# Patient Record
Sex: FEMALE | NOT HISPANIC OR LATINO
[De-identification: names, ages, dates, MRNs, and addresses within clinical notes are randomized per-mention and may not be internally consistent; named-entity substitution may affect disease eponyms.]

---

## 2018-11-26 ENCOUNTER — MEDICATION RENEWAL (OUTPATIENT)
Age: 38
End: 2018-11-26

## 2018-11-26 PROBLEM — Z00.00 ENCOUNTER FOR PREVENTIVE HEALTH EXAMINATION: Status: ACTIVE | Noted: 2018-11-26

## 2018-11-27 ENCOUNTER — RX RENEWAL (OUTPATIENT)
Age: 38
End: 2018-11-27

## 2018-11-28 ENCOUNTER — RX RENEWAL (OUTPATIENT)
Age: 38
End: 2018-11-28

## 2018-12-21 ENCOUNTER — RECORD ABSTRACTING (OUTPATIENT)
Age: 38
End: 2018-12-21

## 2018-12-21 DIAGNOSIS — Z78.9 OTHER SPECIFIED HEALTH STATUS: ICD-10-CM

## 2018-12-21 DIAGNOSIS — K76.9 LIVER DISEASE, UNSPECIFIED: ICD-10-CM

## 2018-12-28 ENCOUNTER — APPOINTMENT (OUTPATIENT)
Dept: GASTROENTEROLOGY | Facility: HOSPITAL | Age: 38
End: 2018-12-28

## 2019-01-25 ENCOUNTER — APPOINTMENT (OUTPATIENT)
Dept: GASTROENTEROLOGY | Facility: CLINIC | Age: 39
End: 2019-01-25
Payer: COMMERCIAL

## 2019-01-25 VITALS
SYSTOLIC BLOOD PRESSURE: 108 MMHG | HEART RATE: 86 BPM | BODY MASS INDEX: 27.17 KG/M2 | OXYGEN SATURATION: 99 % | WEIGHT: 133 LBS | DIASTOLIC BLOOD PRESSURE: 62 MMHG | HEIGHT: 58.5 IN

## 2019-01-25 DIAGNOSIS — K21.9 GASTRO-ESOPHAGEAL REFLUX DISEASE W/OUT ESOPHAGITIS: ICD-10-CM

## 2019-01-25 PROCEDURE — 99214 OFFICE O/P EST MOD 30 MIN: CPT

## 2019-01-25 RX ORDER — OMEPRAZOLE 40 MG/1
40 CAPSULE, DELAYED RELEASE ORAL
Qty: 90 | Refills: 0 | Status: DISCONTINUED | COMMUNITY
Start: 2018-11-26 | End: 2019-01-25

## 2019-01-25 RX ORDER — DROSPIRENONE AND ETHINYL ESTRADIOL 0.02-3(28)
3-0.02 KIT ORAL
Refills: 0 | Status: ACTIVE | COMMUNITY

## 2019-01-25 NOTE — PHYSICAL EXAM
[General Appearance - Alert] : alert [General Appearance - In No Acute Distress] : in no acute distress [Sclera] : the sclera and conjunctiva were normal [Outer Ear] : the ears and nose were normal in appearance [Neck Appearance] : the appearance of the neck was normal [] : no respiratory distress [Apical Impulse] : the apical impulse was normal [Abdomen Soft] : soft [Abdomen Tenderness] : non-tender [Abnormal Walk] : normal gait [Skin Color & Pigmentation] : normal skin color and pigmentation [No Focal Deficits] : no focal deficits [Oriented To Time, Place, And Person] : oriented to person, place, and time

## 2019-01-30 NOTE — HISTORY OF PRESENT ILLNESS
[FreeTextEntry1] : 37 yo f h/o IBS-alternating c/d, h/o Hodgkins Lymphoma,dx June 2015 s/p chemo x 6 mos \par        here for follow up for reflux, constipation/diarrhea. \par \par Today, she continues to take PPI BID. nausea every morning followed by vomiting. weight is stable. 90% of the time she has diarrhea with 2-3 bm per day, watery, brown stool,. no further rectal bleeding/melena. continues to have intermittent heartburn. sore throat is better. feels bloated constantly. . eating well, not as much as she used to . Feels she does not have same exercise capacity that she is used to. She has chronic numbenss/tingling attributed to prior chemotherapy. \par \par MRE 1/21/19- no bowel obstruction, inflammed bowel segments, or abdominal fluid collections. Diffuse hepatic steatosis with stable nodular are of fatty sparing in right hepatic lobe\par \par She has not yet seen neurologist for her headaches and numbness/tingling. \par \par Recent GES and esophageal manometry testing normal. \par \par EGD was normal 06/2018, went to once a day ppi, started vomiting again, then resumed BID dosing. \par \par Linzess started in July at lowest dose, caused too much diarrhea, so she stopped it.\par \par EGD/Colonoscopy 12/28/18- for vomiting, rectal bleeding, diarrhea. \par TI/right colon/left colon/rectum bx normal. Descending colon polyp-hyperplastic\par duodenum- normal, stomach HP negative gastritis\par distal esophagus-hyperplastic changes\par proximal esophagus-normal. \par recall for colonoscopy in 5 years. \par \par Labs 11/2018 notable for low alk phos=24, glucose 111, normal ESR/CRP, B12 252, normal CBC\par

## 2019-01-30 NOTE — ASSESSMENT
[FreeTextEntry1] : repeat labs today. patient referred to neurologist. Recommend 24 hour pH/impedance study for further evaluation of reflux which persists on PPI BID.

## 2019-02-28 ENCOUNTER — APPOINTMENT (OUTPATIENT)
Dept: GASTROENTEROLOGY | Facility: HOSPITAL | Age: 39
End: 2019-02-28

## 2019-03-27 ENCOUNTER — APPOINTMENT (OUTPATIENT)
Dept: NEUROLOGY | Facility: CLINIC | Age: 39
End: 2019-03-27
Payer: COMMERCIAL

## 2019-03-27 VITALS
DIASTOLIC BLOOD PRESSURE: 81 MMHG | WEIGHT: 134 LBS | BODY MASS INDEX: 27.01 KG/M2 | HEIGHT: 59 IN | HEART RATE: 80 BPM | SYSTOLIC BLOOD PRESSURE: 111 MMHG

## 2019-03-27 DIAGNOSIS — Z84.2 FAMILY HISTORY OF OTHER DISEASES OF THE GENITOURINARY SYSTEM: ICD-10-CM

## 2019-03-27 DIAGNOSIS — Z82.49 FAMILY HISTORY OF ISCHEMIC HEART DISEASE AND OTHER DISEASES OF THE CIRCULATORY SYSTEM: ICD-10-CM

## 2019-03-27 DIAGNOSIS — Z83.79 FAMILY HISTORY OF OTHER DISEASES OF THE DIGESTIVE SYSTEM: ICD-10-CM

## 2019-03-27 DIAGNOSIS — Z83.49 FAMILY HISTORY OF OTHER ENDOCRINE, NUTRITIONAL AND METABOLIC DISEASES: ICD-10-CM

## 2019-03-27 DIAGNOSIS — M50.90 CERVICAL DISC DISORDER, UNSPECIFIED, UNSPECIFIED CERVICAL REGION: ICD-10-CM

## 2019-03-27 DIAGNOSIS — Z82.5 FAMILY HISTORY OF ASTHMA AND OTHER CHRONIC LOWER RESPIRATORY DISEASES: ICD-10-CM

## 2019-03-27 DIAGNOSIS — R51 HEADACHE: ICD-10-CM

## 2019-03-27 DIAGNOSIS — S32.009A UNSPECIFIED FRACTURE OF UNSPECIFIED LUMBAR VERTEBRA, INITIAL ENCOUNTER FOR CLOSED FRACTURE: ICD-10-CM

## 2019-03-27 DIAGNOSIS — Z80.9 FAMILY HISTORY OF MALIGNANT NEOPLASM, UNSPECIFIED: ICD-10-CM

## 2019-03-27 PROCEDURE — 99205 OFFICE O/P NEW HI 60 MIN: CPT

## 2019-03-29 PROBLEM — Z82.49 FAMILY HISTORY OF MYOCARDIAL INFARCTION: Status: ACTIVE | Noted: 2019-03-27

## 2019-03-29 PROBLEM — Z83.79 FAMILY HISTORY OF GASTROESOPHAGEAL REFLUX DISEASE: Status: ACTIVE | Noted: 2019-03-27

## 2019-03-29 PROBLEM — Z84.2 FAMILY HISTORY OF PROSTATE PROBLEMS: Status: ACTIVE | Noted: 2019-03-27

## 2019-03-29 PROBLEM — Z82.5 FAMILY HISTORY OF ASTHMA: Status: ACTIVE | Noted: 2019-03-27

## 2019-03-29 PROBLEM — Z80.9 FAMILY HISTORY OF MALIGNANT NEOPLASM: Status: ACTIVE | Noted: 2019-03-27

## 2019-03-29 PROBLEM — Z82.49 FAMILY HISTORY OF HYPERTENSION: Status: ACTIVE | Noted: 2019-03-27

## 2019-03-29 PROBLEM — Z83.49 FAMILY HISTORY OF THYROID DISEASE: Status: ACTIVE | Noted: 2019-03-27

## 2019-03-29 NOTE — HISTORY OF PRESENT ILLNESS
[FreeTextEntry1] : The patient is a 38 year old female who is referred by Dr. Karimi for further evaluation of the following neurological complaints. She reports brief sharp shooting pain over the scalp lasting for less than a minute. These are not associated with vomiting. In additon she reports transient paresthesias over the feet and posterior calf regions  and/or over the hands which mainly occur when is either sitting or laying on one side. She denies focal motor deficits. She has h/o restless leg syndrome and take requip xr 6mg daily. She reports occasional postural tremors which she was told were benign physiologic tremors. \par \par She reports h/o cervical spondylosis and disc herniations and healed  lumbar spine fractures ( occurred while lifting weights) She followed up with a Orthosurgeon and pain management specialist. \par \par Labs ordered by Dr. Karimi- CBC, CMP , Zinc, ceruloplasmin normal.\par

## 2019-03-29 NOTE — REVIEW OF SYSTEMS
[Fever] : fever [Numbness] : numbness [Dizziness] : dizziness [Anxiety] : anxiety [Eye Pain] : eye pain [Red Eyes] : red eyes [Loss Of Hearing] : hearing loss [Palpitations] : palpitations [Shortness Of Breath] : shortness of breath [Wheezing] : wheezing [Abdominal Pain] : abdominal pain [Vomiting] : vomiting [Constipation] : constipation [Diarrhea] : diarrhea [Hot Flashes] : hot flashes [Negative] : Heme/Lymph [FreeTextEntry2] : change in weight, fatigue, weakness, nightsweats  [de-identified] : tremor, feel off balance, change in vision  [FreeTextEntry4] : ringing in ears  [FreeTextEntry5] : chest pressure,  trouble breathing [FreeTextEntry7] : rectal bleeding, hemorrhoids, reflux , irritable bowel  [FreeTextEntry8] : frequent urination, hesitancy  [de-identified] : heat intolerance

## 2019-03-29 NOTE — PHYSICAL EXAM
[General Appearance - Alert] : alert [General Appearance - In No Acute Distress] : in no acute distress [Oriented To Time, Place, And Person] : oriented to person, place, and time [Impaired Insight] : insight and judgment were intact [Affect] : the affect was normal [Person] : oriented to person [Place] : oriented to place [Time] : oriented to time [Concentration Intact] : normal concentrating ability [Visual Intact] : visual attention was ~T not ~L decreased [Naming Objects] : no difficulty naming common objects [Repeating Phrases] : no difficulty repeating a phrase [Writing A Sentence] : no difficulty writing a sentence [Fluency] : fluency intact [Comprehension] : comprehension intact [Reading] : reading intact [Past History] : adequate knowledge of personal past history [Cranial Nerves Optic (II)] : visual acuity intact bilaterally,  visual fields full to confrontation, pupils equal round and reactive to light [Cranial Nerves Oculomotor (III)] : extraocular motion intact [Cranial Nerves Trigeminal (V)] : facial sensation intact symmetrically [Cranial Nerves Facial (VII)] : face symmetrical [Cranial Nerves Vestibulocochlear (VIII)] : hearing was intact bilaterally [Cranial Nerves Glossopharyngeal (IX)] : tongue and palate midline [Cranial Nerves Accessory (XI - Cranial And Spinal)] : head turning and shoulder shrug symmetric [Cranial Nerves Hypoglossal (XII)] : there was no tongue deviation with protrusion [Motor Tone] : muscle tone was normal in all four extremities [Motor Strength] : muscle strength was normal in all four extremities [No Muscle Atrophy] : normal bulk in all four extremities [Sensation Tactile Decrease] : light touch was intact [Sensation Vibration Decrease] : vibration was intact [Abnormal Walk] : normal gait [Balance] : balance was intact [Past-pointing] : there was no past-pointing [Tremor] : no tremor present [2+] : Ankle jerk left 2+ [Plantar Reflex Right Only] : normal on the right [Plantar Reflex Left Only] : normal on the left

## 2019-04-24 ENCOUNTER — APPOINTMENT (OUTPATIENT)
Dept: GASTROENTEROLOGY | Facility: CLINIC | Age: 39
End: 2019-04-24
Payer: COMMERCIAL

## 2019-04-24 VITALS
OXYGEN SATURATION: 99 % | DIASTOLIC BLOOD PRESSURE: 64 MMHG | SYSTOLIC BLOOD PRESSURE: 108 MMHG | HEART RATE: 75 BPM | BODY MASS INDEX: 27.01 KG/M2 | HEIGHT: 59 IN | WEIGHT: 134 LBS

## 2019-04-24 DIAGNOSIS — E53.8 DEFICIENCY OF OTHER SPECIFIED B GROUP VITAMINS: ICD-10-CM

## 2019-04-24 DIAGNOSIS — R11.10 VOMITING, UNSPECIFIED: ICD-10-CM

## 2019-04-24 DIAGNOSIS — K76.0 FATTY (CHANGE OF) LIVER, NOT ELSEWHERE CLASSIFIED: ICD-10-CM

## 2019-04-24 PROCEDURE — 99215 OFFICE O/P EST HI 40 MIN: CPT

## 2019-04-24 NOTE — PHYSICAL EXAM
[General Appearance - Alert] : alert [General Appearance - In No Acute Distress] : in no acute distress [Sclera] : the sclera and conjunctiva were normal [Neck Appearance] : the appearance of the neck was normal [Outer Ear] : the ears and nose were normal in appearance [] : no respiratory distress [Apical Impulse] : the apical impulse was normal [Abdomen Tenderness] : non-tender [Abnormal Walk] : normal gait [Abdomen Soft] : soft [No Focal Deficits] : no focal deficits [Skin Color & Pigmentation] : normal skin color and pigmentation [Oriented To Time, Place, And Person] : oriented to person, place, and time

## 2019-04-28 PROBLEM — R11.10 VOMITING: Status: ACTIVE | Noted: 2018-12-29

## 2019-04-28 PROBLEM — K76.0 STEATOSIS OF LIVER: Status: ACTIVE | Noted: 2018-12-21

## 2019-04-28 LAB
IF BLOCK AB SER QL: NORMAL
PCA AB SER QL IF: NORMAL

## 2019-04-28 NOTE — ASSESSMENT
[FreeTextEntry1] : GERD/vomiting\par + acid reflux on pH study with good symptom correlation while on BID PPI. Patient is symptomatically markedly  improved today with lifestyle changes, I recommended that she continue this. Instructed patient to stop drinking carbonated beverages, counseled patient on aerophagia\par Would not recommend reflux surgery at this time as she is feeling much better; however, if symptoms return in the future could consider trial of Dexilant and reflux surgery could again be considered. \par Continue BID PPI\par IBS\par daily MiraLAX to control overflow diarrhea, patient reports prior bloating with fiber\par Low B12\par check IF/Antiparietal cell ab for low B12, recommend she speak with PMD regarding b12 injections\par Fatty liver\par 45 minutes aerobic exercise daily with goal BMI <25  for fatty liver, continue to avoid alcohol/hepatotoxins\par Colon cancer screening\par recall for colonoscopy in 12/2023\par f/u in 6 months\par f/u with neurology and oncology as scheduled\par

## 2019-04-28 NOTE — HISTORY OF PRESENT ILLNESS
[FreeTextEntry1] : 39 yo f h/o IBS-alternating c/d, h/o Hodgkins Lymphoma,dx June 2015 s/p chemo x 6 mos \par        here for follow up for reflux, constipation/diarrhea. \par \par Today, she reports no vomiting for last 3 weeks. \par She has stopped eating big meals late at night she thinks this has made things better. \par She still has some intermittent heartburn but improved compared to prior, less sore throat, eating well. no weight loss. on BID PPI. \par Often feels bloated, has alternating constipation and diarrhea. She has had 5-6 episodes of diarrhea since yesterday this is a/w abdominal soreness across her lower abdomen,then She will not move her bowels for several days which is typical pattern for her. no brbpr/rmelena. no nocturnal stools. \par She has noted increase in sweating, she has notified her oncologist and will see him in June as scheduled. \par She saw Neuro, she reports normal head imaging, neck and back imaging notable for herniated discs, she has been referred to pain management, she has EMG scheduled. \par Digittrapper pH//impedance study + for acid reflux with + SI and SAP, DeMeester 29.8\par Oncologist Dr. Herbert Smith\par PMD Dr. Roseann Merrill\par \par labs 01/2019- notable for low alk phos 23, alt 30, zinc/ceruloplasmin normal. tsh normal, esr/crp haptoglobin normal, inr normal \par fecal calprotectin normal, O&P/culture/ cdiff negative. \par \par MRE 1/21/19- no bowel obstruction, inflamed bowel segments, or abdominal fluid collections. Diffuse hepatic steatosis with stable nodular are of fatty sparing in right hepatic lobe\par \par CT A/P with oral and IVC 12/2018- hepatomegaly with diffuse fatty infiltration of the liver, no significant change (compared to 6/2015)\par Recent GES and esophageal manometry testing normal. \par \par EGD was normal 06/2018, went to once a day ppi, started vomiting again, then resumed BID dosing. \par \par Linzess started in July at lowest dose, caused too much diarrhea, so she stopped it.\par \par EGD/Colonoscopy 12/28/18- for vomiting, rectal bleeding, diarrhea. \par TI/right colon/left colon/rectum bx normal. Descending colon polyp-hyperplastic\par duodenum- normal, stomach HP negative gastritis\par distal esophagus-hyperplastic changes\par proximal esophagus-normal. \par recall for colonoscopy in 5 years. \par \par Labs 11/2018 notable for low alk phos=24, glucose 111, normal ESR/CRP, B12 252, normal CBC\par

## 2019-05-03 ENCOUNTER — TRANSCRIPTION ENCOUNTER (OUTPATIENT)
Age: 39
End: 2019-05-03

## 2019-05-15 ENCOUNTER — APPOINTMENT (OUTPATIENT)
Dept: NEUROLOGY | Facility: CLINIC | Age: 39
End: 2019-05-15

## 2019-05-15 ENCOUNTER — APPOINTMENT (OUTPATIENT)
Dept: NEUROLOGY | Facility: CLINIC | Age: 39
End: 2019-05-15
Payer: COMMERCIAL

## 2019-05-15 DIAGNOSIS — R20.2 PARESTHESIA OF SKIN: ICD-10-CM

## 2019-05-15 PROCEDURE — 95911 NRV CNDJ TEST 9-10 STUDIES: CPT

## 2019-05-15 PROCEDURE — 95886 MUSC TEST DONE W/N TEST COMP: CPT

## 2019-05-17 PROBLEM — R20.2 PARESTHESIAS: Status: ACTIVE | Noted: 2019-03-27

## 2020-02-14 ENCOUNTER — HOSPITAL ENCOUNTER (OUTPATIENT)
Dept: HOSPITAL 74 - FASU | Age: 40
Discharge: HOME | End: 2020-02-14
Attending: ORTHOPAEDIC SURGERY
Payer: COMMERCIAL

## 2020-02-14 VITALS — BODY MASS INDEX: 26.6 KG/M2

## 2020-02-14 VITALS — SYSTOLIC BLOOD PRESSURE: 116 MMHG | DIASTOLIC BLOOD PRESSURE: 80 MMHG | HEART RATE: 80 BPM

## 2020-02-14 VITALS — TEMPERATURE: 98.7 F
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
## 2020-02-14 DIAGNOSIS — S83.281A: ICD-10-CM

## 2020-02-14 DIAGNOSIS — S83.241A: Primary | ICD-10-CM

## 2020-02-14 DIAGNOSIS — S83.8X1A: ICD-10-CM

## 2020-02-14 DIAGNOSIS — M65.861: ICD-10-CM
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
## 2020-02-14 PROCEDURE — 0SBC4ZZ EXCISION OF RIGHT KNEE JOINT, PERCUTANEOUS ENDOSCOPIC APPROACH: ICD-10-PCS | Performed by: ORTHOPAEDIC SURGERY

## 2020-02-14 NOTE — OP
DATE OF OPERATION:  02/14/2020

 

SURGEON:  Benedicto Ambrosio MD

 

ASSISTANT:  TIFFANY Diaz

 

PREOPERATIVE DIAGNOSES:

1.  Right knee medial and lateral meniscal tear.

2.  Right knee cartilage injury.

3.  Right knee synovitis.

 

POSTOPERATIVE DIAGNOSES:

1.  Right knee medial and lateral meniscal tear.

2.  Right knee cartilage injury.

3.  Right knee synovitis.

 

PROCEDURES:

1.  Right knee arthroscopy with partial meniscectomy, medial and lateral meniscus;

CPT code 38511

2.  Right knee arthroscopy with chondroplasty and abrasion-plasty; CPT code 14756

3.  Right knee arthroscopy with synovectomy; CPT code 53187

 

FINDINGS:

1.  Previous ACL reconstruction and knee arthroscopy.

2.  Medial meniscus body and posterior horn tear.

3.  Lateral meniscus posterior horn tear and small anterior horn tear/minor.

4.  Synovitis patellofemoral medial, lateral and notch area with large medial plica

and adhesions onto medial femoral condyle.

5.  Central grade 2-3 cartilage injury, central portion of medial femoral condyle 4 x

4 cm.

6.  ACL graft intact.

7.  PCL intact.

8.  Minimal cartilage change in lateral joint line.

9.  Diffuse grade 4 changes 60% of patella with a cartilage flap along the medial

border of the grade 4 changes.

10.  _____ 4 changes, patellofemoral trochlea at site of plica abrasion..

 

PROCEDURE:  Informed consent was obtained.  The patient came to the operating room,

where the lower extremity was prepped and draped in a sterile fashion.  A tourniquet

was placed on the upper thigh, but not inflated. 

 

Using standard arthroscopic technique, a lateral incision and portal was made to

allow for introduction of the camera into the suprapatellar bursa.  This was then

taken to the medial joint line, where under direct visualization, a medial incision

and portal was made.

 

Excessive synovium noted in the medial, lateral and patellofemoral and notch area was

removed by an up-biter, shaver and Bovie cautery.  This was found to bring in

inflammatory tissue into the joint surface, a source of pain and dysfunction. 

 

Probing of the medial and lateral meniscus found tears, as described in the findings.

 These were removed with the up-biter and shaver and taken back to a stable rim. 

 

Grade 2 to 3 degenerative changes were treated with a chondroplasty, removing all

flaking surfaces with low-setting Bovie along the periphery to prevent further

flaking.  

 

Grade 4 changes, as noted, were treated with an abrasoplasty, creating a bleeding

surface at the bone/cartilage interface.  Aggressive debridement with shaver/gerry

created bleeding surface.  Microfracture also done when indicated in findings.

 

All areas of the knee were once again re-examined.  The knee was then drained and a

single suture was placed in all portals.  A sterile dressing was placed and the

patient was transferred to the recovery room without complication.

 

The PA listed above was present and assisted at surgery.  Their presence was

absolutely medically necessary for the completion of the procedure.  They helped hold

the arthroscopy, pass instruments (and implants when indicated) and the procedure

could not have been completed without their assistance.

 

 

BENEDICTO AMBROSIO M.D.

 

[id number]

DD: 02/14/2020 11:51

DT: 02/14/2020 12:44

Job #:  [id number]

## 2020-02-17 NOTE — PATH
Surgical Pathology Report



Patient Name:  MARTA GODINEZ

Accession #:  

Med. Rec. #:  [id number]                                                        

   /Age/Gender:  1980 (Age: 39) / F

Account:  [id number]                                                          

             Location: [facility] 

Taken:  2020

Received:  2020

Reported:  2020

Physicians:  Benedicto Ansari M.D.

  



Specimen(s) Received

 SHAVINGS RIGHT KNEE 





Clinical History

Derangement of right knee







Final Diagnosis

KNEE, RIGHT, ARTHROSCOPIC SHAVINGS:

FIBROSYNOVIAL TISSUE, FIBROCARTILAGINOUS TISSUE AND SCANT BONE.





***Electronically Signed***

Dana Hess M.D.





Gross Description

Received in formalin labeled "right knee shavings," is a 3.8 x 2.5 x 0.3 cm

aggregate of tan-yellow soft tissue fragments. The formalin is filtered and the

specimen is entirely submitted in one cassette. 

/2020



[facility]

## 2020-05-11 ENCOUNTER — RX RENEWAL (OUTPATIENT)
Age: 40
End: 2020-05-11

## 2020-05-12 ENCOUNTER — TRANSCRIPTION ENCOUNTER (OUTPATIENT)
Age: 40
End: 2020-05-12

## 2020-09-16 ENCOUNTER — APPOINTMENT (OUTPATIENT)
Dept: GASTROENTEROLOGY | Facility: CLINIC | Age: 40
End: 2020-09-16
Payer: COMMERCIAL

## 2020-09-16 ENCOUNTER — APPOINTMENT (OUTPATIENT)
Dept: GASTROENTEROLOGY | Facility: CLINIC | Age: 40
End: 2020-09-16

## 2020-09-16 VITALS
HEART RATE: 95 BPM | HEIGHT: 59 IN | BODY MASS INDEX: 27.01 KG/M2 | WEIGHT: 134 LBS | DIASTOLIC BLOOD PRESSURE: 62 MMHG | SYSTOLIC BLOOD PRESSURE: 87 MMHG

## 2020-09-16 PROCEDURE — 99215 OFFICE O/P EST HI 40 MIN: CPT

## 2020-09-16 RX ORDER — ROPINIROLE HYDROCHLORIDE 6 MG/1
6 TABLET, FILM COATED, EXTENDED RELEASE ORAL
Refills: 0 | Status: DISCONTINUED | COMMUNITY
End: 2020-09-16

## 2020-09-18 NOTE — HISTORY OF PRESENT ILLNESS
[FreeTextEntry1] : 41 yo f recent ankle surgery, h/o IBS-alternating c/d, h/o Hodgkins Lymphoma,dx June 2015 s/p chemo x 6 mos here for follow up. \par \par last 2-3 weeks, diarrhea multiple times of day-3-8x day, + nocturnal stools, chronic nausea, vomiting started 2 days ago, 6-7 x per day, small amounts, jut bile, non bloody. she feels warm, no documented fevers, sweating more recently,  abd pain-lower abdomen and epigastric pain x 2 weeks comes and goes throughout the day, can feel worse with eating. cramping a/w diarrhea,  lots of mucous in her stool, no brbpr/melena\par weight gain during covid, lost 5 lbs in past 2 weeks. \par acid reflux has not been too bad\par no nsaids in last 1-2 weeks. \par Had brady surgery at end of August, she was taking ibuprofen bid since aug 26, also took vicodin for 2-3 days after surgery (caused some constipation).  diarrhea had started prior to ankle surgery. \par last visit to oncologist 1.5 years ago, told to follow up every 1 year. in Jan 2021 it will be 5 years since last chemo\par \par she had covid test yesterday , results pending. \par she teaches ESL\par \par labs 08/2020-\par cbc/cmet normal except alk phos 21\par \par she had labs prior to ankle surgery they were unremarkable except for chronically low alk phos and elevated BUN\par \par 05/2020- felt well on BID PPI- some breakthrough of vomiting acid in the am and heartburn occasionally, not as severe.  \par \par still gets numbness frequently, positional, she has seen neuro previously had EMG done. \par she saw new neuro recently because of RLS, had ferritin 56 in August. \par \par \par \par Digitrapper pH//impedance study + for acid reflux with + SI and SAP, DeMeester 29.8\par Oncologist Dr. Herbert Smith\par PMD Dr. Roseann Merrill\par \par labs 01/2019- notable for low alk phos 23, alt 30, zinc/ceruloplasmin normal. tsh normal, esr/crp haptoglobin normal, inr normal \par fecal calprotectin normal, O&P/culture/ cdiff negative. \par \par MRE 1/21/19- no bowel obstruction, inflamed bowel segments, or abdominal fluid collections. Diffuse hepatic steatosis with stable nodular are of fatty sparing in right hepatic lobe\par \par CT A/P with oral and IVC 12/2018- hepatomegaly with diffuse fatty infiltration of the liver, no significant change (compared to 6/2015)\par Recent GES and esophageal manometry testing normal. \par \par EGD was normal 06/2018, went to once a day ppi, started vomiting again, then resumed BID dosing. \par \par Linzess started in July at lowest dose, caused too much diarrhea, so she stopped it.\par \par EGD/Colonoscopy 12/28/18- for vomiting, rectal bleeding, diarrhea. \par TI/right colon/left colon/rectum bx normal. Descending colon polyp- hyperplastic\par duodenum- normal, stomach HP negative gastritis\par distal esophagus- hyperplastic changes\par proximal esophagus-normal. \par recall for colonoscopy in 5 years. \par \par Labs 11/2018 notable for low alk phos=24, glucose 111, normal ESR/CRP, B12 252, normal CBC\par

## 2020-09-18 NOTE — ASSESSMENT
[FreeTextEntry1] : Diarrhea -worsening since august, and certainly after ankle surgery. concern for infectious vs. inflammatory colitis. \par Check stool studies as listed. \par \par GERD-patient has long h/o GERD,\par + acid reflux on pH study with good symptom correlation while on BID PPI. I have referred her to surgeon for evaluation of antireflux surgery. Continue BID PPI, may add BID H2RA due to recent increase in n/v. \par \par Low alk phos- will check 24 h urine copper and referred to opthalmology to [facility]\par \par Colon cancer screening-due for colonoscopy in 12/2023\par \par f/u with neurology and oncology as scheduled\par

## 2020-09-18 NOTE — ADDENDUM
[FreeTextEntry1] : Following OV . patient developed perioral and bilateral hand numbness/tingling. Increase respiratory rate, c/o feeling faint. SBP initially 87, HR 90s, BP improved to  without intervention. Patient continued to c/o feeling fait. she was laid supine. had witnessed 2-3 seconds of LOC. Finger stick 94. EMS was called and transported patient to ED where she was evaluated and discharged later in the day after unrevealing CT A/P, EKG, labs. \par \par Followed up with patient and left message after ED visit. Spoke with patient on 9/17, she was feeling back to baseline.

## 2020-09-18 NOTE — PHYSICAL EXAM
[General Appearance - Alert] : alert [General Appearance - In No Acute Distress] : in no acute distress [Sclera] : the sclera and conjunctiva were normal [Outer Ear] : the ears and nose were normal in appearance [] : no respiratory distress [Neck Appearance] : the appearance of the neck was normal [Apical Impulse] : the apical impulse was normal [Abdomen Soft] : soft [Abnormal Walk] : normal gait [Abdomen Tenderness] : non-tender [No Focal Deficits] : no focal deficits [Skin Color & Pigmentation] : normal skin color and pigmentation [Oriented To Time, Place, And Person] : oriented to person, place, and time [FreeTextEntry1] : deferred

## 2020-10-05 ENCOUNTER — TRANSCRIPTION ENCOUNTER (OUTPATIENT)
Age: 40
End: 2020-10-05

## 2020-10-21 DIAGNOSIS — R74.8 ABNORMAL LEVELS OF OTHER SERUM ENZYMES: ICD-10-CM

## 2020-11-25 ENCOUNTER — TRANSCRIPTION ENCOUNTER (OUTPATIENT)
Age: 40
End: 2020-11-25

## 2020-11-30 ENCOUNTER — TRANSCRIPTION ENCOUNTER (OUTPATIENT)
Age: 40
End: 2020-11-30

## 2020-12-19 ENCOUNTER — TRANSCRIPTION ENCOUNTER (OUTPATIENT)
Age: 40
End: 2020-12-19

## 2021-02-03 ENCOUNTER — APPOINTMENT (OUTPATIENT)
Dept: GASTROENTEROLOGY | Facility: CLINIC | Age: 41
End: 2021-02-03
Payer: COMMERCIAL

## 2021-02-03 DIAGNOSIS — K58.2 MIXED IRRITABLE BOWEL SYNDROME: ICD-10-CM

## 2021-02-03 PROCEDURE — 99443: CPT

## 2021-02-05 NOTE — ASSESSMENT
[FreeTextEntry1] : GERD-\par Continue BID PPI\par esophagram\par referred for consult for TIF\par \par Constipation-\par start Linzess\par prune juice\par continue MIraLAX daily for goal 1 bm q 1-2 days\par \par follow up 4 monhts.

## 2021-02-05 NOTE — HISTORY OF PRESENT ILLNESS
[Home] : at home, [unfilled] , at the time of the visit. [Medical Office: ([facility])___] : at the medical office located in  [Verbal consent obtained from patient] : the patient, [unfilled] [FreeTextEntry1] : 39 yo f  h/o Hodgkins Lymphoma,dx June 2015 s/p chemo x 6 mos, h/o ankle surgery, h/o IBS-alternating c/d, presents for follow up. \par \par Today, reflux is mostly controlled on BID PPI. She only has vomiting 1-2x a week which is much less than prior. \par SHe has nausea 3 x per week. Requires BID PPI or else will have more severe symptoms. \par She has been working with neurologist to find right medication for restless leg. \par She is finding these medications cause constipation, having a bm q 3 days, she is taking MiraLAX BID and glycerin suppositories. She feels hemorrhoids are enlarging.  She had tried Linzess in the past but found it caused too much diarrhea. She is seeing nutritionist for weight loss now. on ozempic. currently weighs 139-143 lbs. \par She has not met with surgeon to discuss reflux surgery, she is concerned about side effects.\par Recently had UTI, She has seen urologist, \par urine copper x 2 was low, apparently very dilute sample. Patient admits to driking a lot of water due to recent UTI\par SHe is no longer taking NSAIDS\par no brbpr/melena\par \par she teaches ESL\par \par labs 08/2020-\par cbc/cmet normal except alk phos 21\par \par she had labs prior to ankle surgery they were unremarkable except for chronically low alk phos and elevated BUN\par \par \par Digitrapper pH//impedance study + for acid reflux with + SI and SAP, DeMeester 29.8\par \par Oncologist Dr. Herbert Smith\par PMD Dr. Roseann Merrill\par \par labs 01/2019- notable for low alk phos 23, alt 30, zinc/ceruloplasmin normal. tsh normal, esr/crp haptoglobin normal, inr normal \par fecal calprotectin normal, O&P/culture/ cdiff negative. \par \par MRE 1/21/19- no bowel obstruction, inflamed bowel segments, or abdominal fluid collections. Diffuse hepatic steatosis with stable nodular are of fatty sparing in right hepatic lobe\par \par CT A/P with oral and IVC 12/2018- hepatomegaly with diffuse fatty infiltration of the liver, no significant change (compared to 6/2015)\par Recent GES and esophageal manometry testing normal. \par \par EGD was normal 06/2018, went to once a day ppi, started vomiting again, then resumed BID dosing. \par \par EGD/Colonoscopy 12/28/18- for vomiting, rectal bleeding, diarrhea. \par TI/right colon/left colon/rectum bx normal. Descending colon polyp- hyperplastic, internal hemorrhoids\par duodenum- normal, stomach HP negative gastritis\par distal esophagus- hyperplastic changes\par proximal esophagus-normal. \par recall for colonoscopy in 5 years. \par \par Labs 11/2018 notable for low alk phos=24, glucose 111, normal ESR/CRP, B12 252, normal CBC\par

## 2021-02-05 NOTE — PHYSICAL EXAM
[General Appearance - Alert] : alert [General Appearance - In No Acute Distress] : in no acute distress [Neck Appearance] : the appearance of the neck was normal [Apical Impulse] : the apical impulse was normal [Abdomen Soft] : soft [Abdomen Tenderness] : non-tender [Abnormal Walk] : normal gait [Skin Color & Pigmentation] : normal skin color and pigmentation [No Focal Deficits] : no focal deficits [Oriented To Time, Place, And Person] : oriented to person, place, and time [] : normal voice and communication [Hearing Threshold Finger Rub Not [place]] : hearing was normal [FreeTextEntry1] : deferred

## 2021-02-11 ENCOUNTER — TRANSCRIPTION ENCOUNTER (OUTPATIENT)
Age: 41
End: 2021-02-11

## 2021-02-12 NOTE — REVIEW OF SYSTEMS
Assessment and plan  This is a 44-year-old female with metastatic cholangiocarcinoma, treated with chemotherapy, stent placements, at this time she was admitted with abdominal pain, she was seen by my partner and the fentanyl patch was increased from 25 to 50 mcg strength.  A new patch went on yesterday afternoon.  She is not experiencing much relief of pain.  The oncologist talked her about obtaining serial plexus block for alleviation of the pain.  She is still going to receive chemotherapy next month.  Patient is complaining of constant pain in the right lower quadrant and epigastric area.  Otherwise the abdomen is soft.  Tenderness is noted there is no rebound.    I spoke to the patient about attendant risks of celiac plexus block including intractable diarrhea and hypotension and lack of efficacy.    The patient wishes to try.  We will set her up for CT scanner for CT guided celiac plexus block tomorrow when she can be n.p.o. so she can receive some sedation.  Anticoagulated medication changes until after the celiac plexus block.    I will continue to follow.    Thank you very much.             Electronically Signed On 06.10.2020 13:13  ___________________________________________________   Alyson ZHAO, Tova DOWELL     [As Noted in HPI] : as noted in HPI [Negative] : Psychiatric

## 2021-02-18 ENCOUNTER — TRANSCRIPTION ENCOUNTER (OUTPATIENT)
Age: 41
End: 2021-02-18

## 2022-05-30 ENCOUNTER — RX RENEWAL (OUTPATIENT)
Age: 42
End: 2022-05-30

## 2023-05-24 ENCOUNTER — RX RENEWAL (OUTPATIENT)
Age: 43
End: 2023-05-24

## 2023-08-31 ENCOUNTER — APPOINTMENT (OUTPATIENT)
Dept: GASTROENTEROLOGY | Facility: CLINIC | Age: 43
End: 2023-08-31
Payer: COMMERCIAL

## 2023-08-31 VITALS
DIASTOLIC BLOOD PRESSURE: 64 MMHG | SYSTOLIC BLOOD PRESSURE: 108 MMHG | HEART RATE: 75 BPM | BODY MASS INDEX: 22.38 KG/M2 | WEIGHT: 111 LBS | OXYGEN SATURATION: 98 % | HEIGHT: 59 IN

## 2023-08-31 DIAGNOSIS — G89.29 UNSPECIFIED ABDOMINAL PAIN: ICD-10-CM

## 2023-08-31 DIAGNOSIS — R10.9 UNSPECIFIED ABDOMINAL PAIN: ICD-10-CM

## 2023-08-31 PROCEDURE — 99204 OFFICE O/P NEW MOD 45 MIN: CPT

## 2023-08-31 PROCEDURE — 99214 OFFICE O/P EST MOD 30 MIN: CPT

## 2023-08-31 RX ORDER — SEMAGLUTIDE 1.34 MG/ML
4 INJECTION, SOLUTION SUBCUTANEOUS
Refills: 0 | Status: ACTIVE | COMMUNITY

## 2023-08-31 RX ORDER — LINACLOTIDE 72 UG/1
72 CAPSULE, GELATIN COATED ORAL
Qty: 30 | Refills: 3 | Status: DISCONTINUED | COMMUNITY
Start: 2021-02-03 | End: 2023-08-31

## 2023-08-31 RX ORDER — PRAMIPEXOLE DIHYDROCHLORIDE 1.5 MG/1
1.5 TABLET ORAL
Refills: 0 | Status: ACTIVE | COMMUNITY

## 2023-08-31 RX ORDER — PREGABALIN 150 MG/1
150 CAPSULE ORAL
Refills: 0 | Status: DISCONTINUED | COMMUNITY
End: 2023-08-31

## 2023-09-10 NOTE — ASSESSMENT
[FreeTextEntry1] : GERD-she is considering reflux surgery Continue BID PPI plan for EGD r/o esophagitis in light of persistent symptoms on BID PPI.  referred for consult for TIF, she will consider  Diarrhea-  reports prior labs normal with PMD check stool studies colonoscopy with biopsy    CRC screening -colonoscopy 2 Dulcolax two days prior to procedure + Split MiraLAX/Dulcolax preparation starting day prior to procedure

## 2023-09-10 NOTE — PHYSICAL EXAM
[General Appearance - Alert] : alert [General Appearance - In No Acute Distress] : in no acute distress [Hearing Threshold Finger Rub Not [place]] : hearing was normal [Neck Appearance] : the appearance of the neck was normal [] : no respiratory distress [Apical Impulse] : the apical impulse was normal [Abdomen Soft] : soft [Abdomen Tenderness] : non-tender [Abnormal Walk] : normal gait [Skin Color & Pigmentation] : normal skin color and pigmentation [No Focal Deficits] : no focal deficits [Oriented To Time, Place, And Person] : oriented to person, place, and time [FreeTextEntry1] : deferred

## 2023-09-10 NOTE — HISTORY OF PRESENT ILLNESS
[FreeTextEntry1] : 41 yo f  h/o Hodgkins Lymphoma,dx June 2015 s/p chemo x 6 mos, h/o ankle surgery, h/o IBS-alternating c/d, presents for evaluation of diarrhea  diarrhea for months, 5-6 x per day, watery brown, not bloody.  very occ constipation a/w cramping, lower abdomen, feels like menstrual cramps most days, can last for 1 min to 1 hour.  still occ nausea in the morning, reflux much better on BID PPI. never went for TIF consult, would like to reconsider this option.  for last year occasional right flank stabbing pain , lasts up to 10 sec. for 3 months  left sided sharp pain a/s movement. she has been referred to sports medicine. she feels something hard in this area.  she started ozempic weight has gone from 150 lbs--->110 lbs.  megan, knee pain, predm now resolved.  she is on OCP , has occasional breakthrough bleeding  reports labs normal with PMD except for vitamin D   hx 03/2021 Today, reflux is mostly controlled on BID PPI. She only has vomiting 1-2x a week which is much less than prior.  SHe has nausea 3 x per week. Requires BID PPI or else will have more severe symptoms.  She has been working with neurologist to find right medication for restless leg.  She is finding these medications cause constipation, having a bm q 3 days, she is taking MiraLAX BID and glycerin suppositories. She feels hemorrhoids are enlarging.  She had tried Linzess in the past but found it caused too much diarrhea. She is seeing nutritionist for weight loss now. on ozempic. currently weighs 139-143 lbs.  She has not met with surgeon to discuss reflux surgery, she is concerned about side effects. Recently had UTI, She has seen urologist,  urine copper x 2 was low, apparently very dilute sample. 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 Herbert Smith PMD Dr. Roseann Merrill  labs 01/2019- notable for low alk phos 23, alt 30, zinc/ceruloplasmin normal. tsh normal, esr/crp haptoglobin normal, inr normal  fecal calprotectin normal, O&P/culture/ cdiff negative.   MRE 1/21/19- no bowel obstruction, inflamed bowel segments, or abdominal fluid collections. Diffuse hepatic steatosis with stable nodular are of fatty sparing in right hepatic lobe  CT A/P with oral and IVC 12/2018- hepatomegaly with diffuse fatty infiltration of the liver, no significant change (compared to 6/2015) Recent GES and esophageal manometry testing normal.   EGD was normal 06/2018, went to once a day ppi, started vomiting again, then resumed BID dosing.   EGD/Colonoscopy 12/28/18- for vomiting, rectal bleeding, diarrhea.  TI/right colon/left colon/rectum bx normal. Descending colon polyp- hyperplastic, internal hemorrhoids duodenum- normal, stomach HP negative gastritis distal esophagus- hyperplastic changes proximal esophagus-normal.  recall for colonoscopy in 5 years.   Labs 11/2018 notable for low alk phos=24, glucose 111, normal ESR/CRP, B12 252, normal CBC

## 2023-09-25 ENCOUNTER — RESULT REVIEW (OUTPATIENT)
Age: 43
End: 2023-09-25

## 2023-10-08 ENCOUNTER — TRANSCRIPTION ENCOUNTER (OUTPATIENT)
Age: 43
End: 2023-10-08

## 2023-11-08 ENCOUNTER — APPOINTMENT (OUTPATIENT)
Dept: GASTROENTEROLOGY | Facility: CLINIC | Age: 43
End: 2023-11-08

## 2023-11-19 ENCOUNTER — RESULT REVIEW (OUTPATIENT)
Age: 43
End: 2023-11-19

## 2023-11-20 ENCOUNTER — RESULT REVIEW (OUTPATIENT)
Age: 43
End: 2023-11-20

## 2023-11-20 ENCOUNTER — APPOINTMENT (OUTPATIENT)
Dept: GASTROENTEROLOGY | Facility: CLINIC | Age: 43
End: 2023-11-20

## 2024-02-14 ENCOUNTER — APPOINTMENT (OUTPATIENT)
Dept: GASTROENTEROLOGY | Facility: CLINIC | Age: 44
End: 2024-02-14
Payer: COMMERCIAL

## 2024-02-14 VITALS
HEIGHT: 59.5 IN | HEART RATE: 94 BPM | WEIGHT: 110 LBS | DIASTOLIC BLOOD PRESSURE: 80 MMHG | SYSTOLIC BLOOD PRESSURE: 100 MMHG | OXYGEN SATURATION: 98 % | RESPIRATION RATE: 16 BRPM | BODY MASS INDEX: 21.89 KG/M2

## 2024-02-14 DIAGNOSIS — K58.0 IRRITABLE BOWEL SYNDROME WITH DIARRHEA: ICD-10-CM

## 2024-02-14 DIAGNOSIS — K21.9 GASTRO-ESOPHAGEAL REFLUX DISEASE W/OUT ESOPHAGITIS: ICD-10-CM

## 2024-02-14 DIAGNOSIS — R19.7 DIARRHEA, UNSPECIFIED: ICD-10-CM

## 2024-02-14 DIAGNOSIS — Z12.11 ENCOUNTER FOR SCREENING FOR MALIGNANT NEOPLASM OF COLON: ICD-10-CM

## 2024-02-14 PROCEDURE — G2211 COMPLEX E/M VISIT ADD ON: CPT

## 2024-02-14 PROCEDURE — 99214 OFFICE O/P EST MOD 30 MIN: CPT

## 2024-02-14 RX ORDER — RIFAXIMIN 550 MG/1
550 TABLET ORAL
Qty: 42 | Refills: 0 | Status: ACTIVE | COMMUNITY
Start: 2024-02-14 | End: 1900-01-01

## 2024-02-14 NOTE — HISTORY OF PRESENT ILLNESS
[FreeTextEntry1] : 44 yo f  h/o Hodgkins Lymphoma,dx June 2015 s/p chemo x 6 mos, h/o ankle surgery, h/o IBS-alternating c/d, presents for follow up of diarrhea  feels nauseas every morning, vomiting many days, also induces vomiting sometimes because feels so nausea. she vomits food in the morning if she eats late at night , if she eats early then vomits bile.  diarrhea every day, 5-6 per day, + nocturnal stools, + FI. bm usually 3am - 6am, sometimes later in the evening.  pain on side persists when bending,  when she has diarrhea, pain is so intense she feels like vomiting while having diarrhea feels very bloated/distended. gasx is helpful.  weight stable at 110 lbs  hemorrhoids, swelling, no bleeding prior stool tests normal. GI PCR neg, Cdiff, neg, elastase wnl.   CT A/P 9/2023:   IMPRESSION:  Cause the patient's symptoms not definitely identified.  Incidental findings as above.  EGD/cscope 11/2023- EGd path benign, hyperplastic rectal polyp, no colitis, ti bx normal. recall in 5 y  prior hx  diarrhea for months, 5-6 x per day, watery brown, not bloody.  very occ constipation a/w cramping, lower abdomen, feels like menstrual cramps most days, can last for 1 min to 1 hour.  still occ nausea in the morning, reflux much better on BID PPI. never went for TIF consult, would like to reconsider this option.  for last year occasional right flank stabbing pain , lasts up to 10 sec. for 3 months  left sided sharp pain a/s movement. she has been referred to sports medicine. she feels something hard in this area.  she started ozempic weight has gone from 150 lbs--->110 lbs.  megan, knee pain, predm now resolved.  she is on OCP , has occasional breakthrough bleeding  reports labs normal with PMD except for vitamin D   hx 03/2021 Today, reflux is mostly controlled on BID PPI. She only has vomiting 1-2x a week which is much less than prior.  SHe has nausea 3 x per week. Requires BID PPI or else will have more severe symptoms.  She has been working with neurologist to find right medication for restless leg.  She is finding these medications cause constipation, having a bm q 3 days, she is taking MiraLAX BID and glycerin suppositories. She feels hemorrhoids are enlarging.  She had tried Linzess in the past but found it caused too much diarrhea. She is seeing nutritionist for weight loss now. on ozempic. currently weighs 139-143 lbs.  She has not met with surgeon to discuss reflux surgery, she is concerned about side effects. Recently had UTI, She has seen urologist,  urine copper x 2 was low, apparently very dilute sample. 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 Herbert Smith PMD Dr. Roseann Merrill  labs 01/2019- notable for low alk phos 23, alt 30, zinc/ceruloplasmin normal. tsh normal, esr/crp haptoglobin normal, inr normal  fecal calprotectin normal, O&P/culture/ cdiff negative.   MRE 1/21/19- no bowel obstruction, inflamed bowel segments, or abdominal fluid collections. Diffuse hepatic steatosis with stable nodular are of fatty sparing in right hepatic lobe  CT A/P with oral and IVC 12/2018- hepatomegaly with diffuse fatty infiltration of the liver, no significant change (compared to 6/2015) Recent GES and esophageal manometry testing normal.   EGD was normal 06/2018, went to once a day ppi, started vomiting again, then resumed BID dosing.   EGD/Colonoscopy 12/28/18- for vomiting, rectal bleeding, diarrhea.  TI/right colon/left colon/rectum bx normal. Descending colon polyp- hyperplastic, internal hemorrhoids duodenum- normal, stomach HP negative gastritis distal esophagus- hyperplastic changes proximal esophagus-normal.  recall for colonoscopy in 5 years.   Labs 11/2018 notable for low alk phos=24, glucose 111, normal ESR/CRP, B12 252, normal CBC

## 2024-02-14 NOTE — PHYSICAL EXAM
[General Appearance - Alert] : alert [General Appearance - In No Acute Distress] : in no acute distress [Hearing Threshold Finger Rub Not [place]] : hearing was normal [Neck Appearance] : the appearance of the neck was normal [] : no respiratory distress [Apical Impulse] : the apical impulse was normal [Abdomen Soft] : soft [Abdomen Tenderness] : non-tender [Abnormal Walk] : normal gait [Skin Color & Pigmentation] : normal skin color and pigmentation [No Focal Deficits] : no focal deficits [Oriented To Time, Place, And Person] : oriented to person, place, and time [FreeTextEntry1] : deferred

## 2024-02-14 NOTE — ADDENDUM
[FreeTextEntry1] : 9/28/23- spoke with patient reviewed CT A/P results- patient aware of fibroids.  IMPRESSION:  Cause the patient's symptoms not definitely identified.  Incidental findings as above.

## 2024-02-14 NOTE — ASSESSMENT
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  recommend sb evaluation with MRE and EGD with VCE dropped into small bowel  Xifaxin   CRC screening- recall 11/2028

## 2024-03-12 ENCOUNTER — RESULT REVIEW (OUTPATIENT)
Age: 44
End: 2024-03-12

## 2024-03-19 ENCOUNTER — TRANSCRIPTION ENCOUNTER (OUTPATIENT)
Age: 44
End: 2024-03-19

## 2024-04-10 ENCOUNTER — RESULT REVIEW (OUTPATIENT)
Age: 44
End: 2024-04-10

## 2024-04-10 ENCOUNTER — APPOINTMENT (OUTPATIENT)
Dept: GASTROENTEROLOGY | Facility: HOSPITAL | Age: 44
End: 2024-04-10

## 2024-05-20 ENCOUNTER — RX RENEWAL (OUTPATIENT)
Age: 44
End: 2024-05-20

## 2024-05-20 RX ORDER — PANTOPRAZOLE 40 MG/1
40 TABLET, DELAYED RELEASE ORAL
Qty: 180 | Refills: 3 | Status: ACTIVE | COMMUNITY
Start: 2018-11-28 | End: 1900-01-01

## 2025-05-13 ENCOUNTER — RX RENEWAL (OUTPATIENT)
Age: 45
End: 2025-05-13

## 2025-05-14 ENCOUNTER — APPOINTMENT (OUTPATIENT)
Dept: GASTROENTEROLOGY | Facility: CLINIC | Age: 45
End: 2025-05-14
Payer: COMMERCIAL

## 2025-05-14 VITALS — BODY MASS INDEX: 28.8 KG/M2 | WEIGHT: 145 LBS

## 2025-05-14 VITALS
OXYGEN SATURATION: 98 % | HEART RATE: 94 BPM | WEIGHT: 135 LBS | DIASTOLIC BLOOD PRESSURE: 80 MMHG | BODY MASS INDEX: 26.81 KG/M2 | SYSTOLIC BLOOD PRESSURE: 100 MMHG

## 2025-05-14 DIAGNOSIS — R19.7 DIARRHEA, UNSPECIFIED: ICD-10-CM

## 2025-05-14 DIAGNOSIS — K21.9 GASTRO-ESOPHAGEAL REFLUX DISEASE W/OUT ESOPHAGITIS: ICD-10-CM

## 2025-05-14 PROCEDURE — G2211 COMPLEX E/M VISIT ADD ON: CPT | Mod: NC

## 2025-05-14 PROCEDURE — 99214 OFFICE O/P EST MOD 30 MIN: CPT

## 2025-05-14 RX ORDER — CHOLESTYRAMINE 4 G/9G
4 POWDER, FOR SUSPENSION ORAL DAILY
Qty: 1 | Refills: 3 | Status: ACTIVE | COMMUNITY
Start: 2025-05-14 | End: 1900-01-01

## 2025-05-30 ENCOUNTER — TRANSCRIPTION ENCOUNTER (OUTPATIENT)
Age: 45
End: 2025-05-30

## 2025-06-03 ENCOUNTER — TRANSCRIPTION ENCOUNTER (OUTPATIENT)
Age: 45
End: 2025-06-03

## 2025-06-12 PROBLEM — R16.0 HEPATOMEGALY: Status: ACTIVE | Noted: 2025-06-12

## 2025-06-13 ENCOUNTER — TRANSCRIPTION ENCOUNTER (OUTPATIENT)
Age: 45
End: 2025-06-13

## 2025-07-18 ENCOUNTER — RESULT REVIEW (OUTPATIENT)
Age: 45
End: 2025-07-18